# Patient Record
Sex: MALE | Race: WHITE | NOT HISPANIC OR LATINO
[De-identification: names, ages, dates, MRNs, and addresses within clinical notes are randomized per-mention and may not be internally consistent; named-entity substitution may affect disease eponyms.]

---

## 2023-03-29 PROBLEM — Z00.00 ENCOUNTER FOR PREVENTIVE HEALTH EXAMINATION: Status: ACTIVE | Noted: 2023-03-29

## 2023-04-21 ENCOUNTER — NON-APPOINTMENT (OUTPATIENT)
Age: 83
End: 2023-04-21

## 2023-04-21 ENCOUNTER — APPOINTMENT (OUTPATIENT)
Dept: PULMONOLOGY | Facility: CLINIC | Age: 83
End: 2023-04-21
Payer: MEDICARE

## 2023-04-21 VITALS
HEART RATE: 97 BPM | TEMPERATURE: 97.6 F | HEIGHT: 64 IN | BODY MASS INDEX: 25.61 KG/M2 | DIASTOLIC BLOOD PRESSURE: 70 MMHG | OXYGEN SATURATION: 97 % | SYSTOLIC BLOOD PRESSURE: 112 MMHG | WEIGHT: 150 LBS

## 2023-04-21 DIAGNOSIS — Z86.39 PERSONAL HISTORY OF OTHER ENDOCRINE, NUTRITIONAL AND METABOLIC DISEASE: ICD-10-CM

## 2023-04-21 DIAGNOSIS — Z86.79 PERSONAL HISTORY OF OTHER DISEASES OF THE CIRCULATORY SYSTEM: ICD-10-CM

## 2023-04-21 DIAGNOSIS — U07.1 COVID-19: ICD-10-CM

## 2023-04-21 DIAGNOSIS — E78.00 PURE HYPERCHOLESTEROLEMIA, UNSPECIFIED: ICD-10-CM

## 2023-04-21 DIAGNOSIS — Z85.72 PERSONAL HISTORY OF NON-HODGKIN LYMPHOMAS: ICD-10-CM

## 2023-04-21 PROCEDURE — 99204 OFFICE O/P NEW MOD 45 MIN: CPT | Mod: 25

## 2023-04-21 PROCEDURE — 94010 BREATHING CAPACITY TEST: CPT

## 2023-04-21 NOTE — REVIEW OF SYSTEMS
[Postnasal Drip] : postnasal drip [Nasal Congestion] : no nasal congestion [Sinus Problems] : no sinus problems [Cough] : no cough [Sputum] : no sputum [Dyspnea] : no dyspnea [Wheezing] : no wheezing [SOB on Exertion] : no sob on exertion [Chest Discomfort] : no chest discomfort [Edema] : no edema [Orthopnea] : no orthopnea [Palpitations] : no palpitations [Phlebitis] : no phlebitis [Seasonal Allergies] : no seasonal allergies [GERD] : no gerd [Diarrhea] : no diarrhea [Constipation] : no constipation [Dysphagia] : no dysphagia [Nocturia] : no nocturia [Frequency] : no dysuria [Anemia] : no anemia [Headache] : no headache [Dizziness] : no dizziness [Memory Loss] : no memory loss [TextBox_91] : shoulder  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

## 2023-04-21 NOTE — ASSESSMENT
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

## 2023-04-21 NOTE — PHYSICAL EXAM
[No Acute Distress] : no acute distress [Normal Oropharynx] : normal oropharynx [No Neck Mass] : no neck mass [Normal Appearance] : normal appearance [Normal Rate/Rhythm] : normal rate/rhythm [Normal S1, S2] : normal s1, s2 [No Murmurs] : no murmurs [No Resp Distress] : no resp distress [No Abnormalities] : no abnormalities [Benign] : benign [Normal Gait] : normal gait [No Clubbing] : no clubbing [No Cyanosis] : no cyanosis [No Edema] : no edema [FROM] : FROM [Normal Color/ Pigmentation] : normal color/ pigmentation [No Focal Deficits] : no focal deficits [Oriented x3] : oriented x3 [Normal Affect] : normal affect [TextBox_68] : diffuse bilat crackles : cellophane C/w PULM  Fibrosis

## 2023-04-21 NOTE — HISTORY OF PRESENT ILLNESS
[Former] : former [Never] : never [TextBox_4] : 4/21/23 The patient is a 83 yo malse with a hx of finding of pulmonary fibrosis on a CTA of the lung being done to R/O a PE. He has no resp sx: cough, sputum, dyspnea, wheeze or hx of pna. 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

## 2023-04-21 NOTE — REASON FOR VISIT
[Initial] : an initial visit [Pulmonary Fibrosis] : pulmonary fibrosis [Cough] : cough [TextBox_44] : Pt states he was sent by his PCP for possible pulmonary Fibrosis. CT Chest 4/6/2023 , CT Chest 12/2/2021 [facility], PET/CT 9/26/2019 [facility]. Pt states he has a dry cough.  No other pulmonary issues.

## 2023-05-30 ENCOUNTER — APPOINTMENT (OUTPATIENT)
Dept: PULMONOLOGY | Facility: CLINIC | Age: 83
End: 2023-05-30
Payer: MEDICARE

## 2023-05-30 VITALS
DIASTOLIC BLOOD PRESSURE: 88 MMHG | TEMPERATURE: 97.3 F | OXYGEN SATURATION: 98 % | BODY MASS INDEX: 25.61 KG/M2 | HEART RATE: 75 BPM | WEIGHT: 150 LBS | HEIGHT: 64 IN | SYSTOLIC BLOOD PRESSURE: 142 MMHG

## 2023-05-30 PROCEDURE — 94010 BREATHING CAPACITY TEST: CPT

## 2023-05-30 PROCEDURE — 94727 GAS DIL/WSHOT DETER LNG VOL: CPT

## 2023-05-30 PROCEDURE — 94729 DIFFUSING CAPACITY: CPT

## 2023-05-30 PROCEDURE — 99214 OFFICE O/P EST MOD 30 MIN: CPT | Mod: 25

## 2023-05-30 NOTE — REASON FOR VISIT
[Follow-Up] : a follow-up visit [Cough] : cough [Pulmonary Fibrosis] : pulmonary fibrosis [TextBox_44] : 1 month. PFT performed today, 5/30/2023. Pt states he has a cough in the morning when he gets out of bed. Pt did not do labs ordered as he did not know they were to be done right away.

## 2023-05-30 NOTE — REVIEW OF SYSTEMS
[Nasal Congestion] : no nasal congestion [Postnasal Drip] : postnasal drip [Sinus Problems] : no sinus problems [Cough] : no cough [Sputum] : no sputum [Dyspnea] : no dyspnea [Wheezing] : no wheezing [SOB on Exertion] : no sob on exertion [Chest Discomfort] : no chest discomfort [Edema] : no edema [Orthopnea] : no orthopnea [Palpitations] : no palpitations [Phlebitis] : no phlebitis [Seasonal Allergies] : no seasonal allergies [GERD] : no gerd [Diarrhea] : no diarrhea [Constipation] : no constipation [Dysphagia] : no dysphagia [Nocturia] : no nocturia [Frequency] : no dysuria [Anemia] : no anemia [Headache] : no headache [Dizziness] : no dizziness [Memory Loss] : no memory loss [TextBox_91] : shoulder  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

## 2023-05-30 NOTE — PHYSICAL EXAM
[No Acute Distress] : no acute distress [Normal Oropharynx] : normal oropharynx [Normal Appearance] : normal appearance [No Neck Mass] : no neck mass [Normal Rate/Rhythm] : normal rate/rhythm [Normal S1, S2] : normal s1, s2 [No Murmurs] : no murmurs [No Resp Distress] : no resp distress [No Abnormalities] : no abnormalities [Benign] : benign [Normal Gait] : normal gait [No Clubbing] : no clubbing [No Cyanosis] : no cyanosis [No Edema] : no edema [FROM] : FROM [Normal Color/ Pigmentation] : normal color/ pigmentation [No Focal Deficits] : no focal deficits [Oriented x3] : oriented x3 [Normal Affect] : normal affect [TextBox_68] : diffuse bilat crackles : cellophane C/w PULM  Fibrosis

## 2023-05-30 NOTE — HISTORY OF PRESENT ILLNESS
[Former] : former [Never] : never [TextBox_4] : 4/21/23 The patient is a 81 yo malse with a hx of finding of pulmonary fibrosis on a CTA of the lung being done to R/O a PE. He has no resp sx: cough, sputum, dyspnea, wheeze or hx of pna. 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 He did have a PFt  and prior to that a Ct of th chest.  He had a PFT in the office today  [TextBox_11] : 1 [TextBox_13] : 21 [YearQuit] : 1977

## 2023-05-30 NOTE — ASSESSMENT
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     At this point his spirometry is normal , the rest of a PFt will  measure the DLCo    for now he has sufficient fibrosis and progression to warrant  RX  time spent 45 minn  time spent 45 min counseling, education, documentation, imaging reviewed, medication reviewed, inhaler demonstrated, old records reviewed, HX and PE   \par \par 5/30/23 The patient is reporting his activity is stable  He had a PFt which quantifies his pulmnary function He had a normal spirometry with a all valves > 100%   His Volumes  revealed a normal TLC and FRC  His DLCO corrected is 75% given this surprisingly better than expected PFT  I have decided that at age 83 he can wait to see if he needs to start therapy as he has sufficient reserves to fit his activity    serial PFts will be followed the next one in 3mos time spent 30 min  counseling, education, documentation, imaging reviewed, medication reviewed, inhaler demonstrated, old records reviewed, HX and PE

## 2023-09-05 ENCOUNTER — APPOINTMENT (OUTPATIENT)
Dept: PULMONOLOGY | Facility: CLINIC | Age: 83
End: 2023-09-05

## 2023-09-05 ENCOUNTER — APPOINTMENT (OUTPATIENT)
Dept: PULMONOLOGY | Facility: CLINIC | Age: 83
End: 2023-09-05
Payer: MEDICARE

## 2023-09-05 VITALS
SYSTOLIC BLOOD PRESSURE: 122 MMHG | HEART RATE: 80 BPM | OXYGEN SATURATION: 97 % | DIASTOLIC BLOOD PRESSURE: 78 MMHG | WEIGHT: 150 LBS | BODY MASS INDEX: 25.61 KG/M2 | TEMPERATURE: 97.4 F | HEIGHT: 64 IN

## 2023-09-05 PROCEDURE — 94010 BREATHING CAPACITY TEST: CPT

## 2023-09-05 PROCEDURE — 94727 GAS DIL/WSHOT DETER LNG VOL: CPT

## 2023-09-05 PROCEDURE — 94729 DIFFUSING CAPACITY: CPT

## 2023-09-05 RX ORDER — NINTEDANIB 150 MG/1
150 CAPSULE ORAL
Qty: 90 | Refills: 3 | Status: DISCONTINUED | COMMUNITY
Start: 2023-04-21 | End: 2023-09-05

## 2023-09-06 ENCOUNTER — NON-APPOINTMENT (OUTPATIENT)
Age: 83
End: 2023-09-06

## 2023-10-31 ENCOUNTER — APPOINTMENT (OUTPATIENT)
Dept: PULMONOLOGY | Facility: CLINIC | Age: 83
End: 2023-10-31
Payer: MEDICARE

## 2023-10-31 VITALS
WEIGHT: 147 LBS | BODY MASS INDEX: 25.1 KG/M2 | HEART RATE: 68 BPM | OXYGEN SATURATION: 98 % | TEMPERATURE: 97.4 F | DIASTOLIC BLOOD PRESSURE: 80 MMHG | HEIGHT: 64 IN | SYSTOLIC BLOOD PRESSURE: 130 MMHG

## 2023-10-31 PROCEDURE — 94727 GAS DIL/WSHOT DETER LNG VOL: CPT

## 2023-10-31 PROCEDURE — 99214 OFFICE O/P EST MOD 30 MIN: CPT | Mod: 25

## 2023-10-31 PROCEDURE — 94010 BREATHING CAPACITY TEST: CPT

## 2023-10-31 PROCEDURE — 94729 DIFFUSING CAPACITY: CPT

## 2023-10-31 RX ORDER — FAMOTIDINE 10 MG/1
TABLET, FILM COATED ORAL
Refills: 0 | Status: ACTIVE | COMMUNITY

## 2023-10-31 RX ORDER — LEVOTHYROXINE SODIUM 0.17 MG/1
TABLET ORAL
Refills: 0 | Status: ACTIVE | COMMUNITY

## 2023-10-31 RX ORDER — DOCUSATE SODIUM 100 MG/1
CAPSULE ORAL
Refills: 0 | Status: ACTIVE | COMMUNITY

## 2023-10-31 RX ORDER — ATORVASTATIN CALCIUM 10 MG/1
10 TABLET, FILM COATED ORAL
Refills: 0 | Status: ACTIVE | COMMUNITY

## 2023-10-31 RX ORDER — VIT A/VIT C/VIT E/ZINC/COPPER 2148-113
TABLET ORAL
Refills: 0 | Status: ACTIVE | COMMUNITY

## 2023-10-31 RX ORDER — ASPIRIN 81 MG
81 TABLET, DELAYED RELEASE (ENTERIC COATED) ORAL
Refills: 0 | Status: ACTIVE | COMMUNITY

## 2023-10-31 RX ORDER — AMLODIPINE BESYLATE 2.5 MG/1
2.5 TABLET ORAL
Refills: 0 | Status: ACTIVE | COMMUNITY

## 2023-10-31 RX ORDER — LOSARTAN POTASSIUM 100 MG/1
TABLET, FILM COATED ORAL
Refills: 0 | Status: ACTIVE | COMMUNITY

## 2023-12-14 ENCOUNTER — OFFICE (OUTPATIENT)
Facility: LOCATION | Age: 83
Setting detail: OPHTHALMOLOGY
End: 2023-12-14
Payer: MEDICARE

## 2023-12-14 ENCOUNTER — RX ONLY (RX ONLY)
Age: 83
End: 2023-12-14

## 2023-12-14 DIAGNOSIS — H35.373: ICD-10-CM

## 2023-12-14 DIAGNOSIS — H01.002: ICD-10-CM

## 2023-12-14 DIAGNOSIS — H43.813: ICD-10-CM

## 2023-12-14 DIAGNOSIS — H01.005: ICD-10-CM

## 2023-12-14 DIAGNOSIS — H01.004: ICD-10-CM

## 2023-12-14 DIAGNOSIS — H01.001: ICD-10-CM

## 2023-12-14 DIAGNOSIS — H16.223: ICD-10-CM

## 2023-12-14 DIAGNOSIS — H31.29: ICD-10-CM

## 2023-12-14 DIAGNOSIS — H35.3131: ICD-10-CM

## 2023-12-14 DIAGNOSIS — H35.033: ICD-10-CM

## 2023-12-14 PROBLEM — H02.83 DERMATOCHALASIS ; BOTH EYES: Status: ACTIVE | Noted: 2023-12-14

## 2023-12-14 PROBLEM — H43.393 VITREOUS FLOATERS; BOTH EYES: Status: ACTIVE | Noted: 2023-12-14

## 2023-12-14 PROBLEM — H18.513 ENDOTHELIAL CORNEAL DYSTROPHY; BOTH EYES: Status: ACTIVE | Noted: 2023-12-14

## 2023-12-14 PROBLEM — Z96.1 PSEUDOPHAKIA ; BOTH EYES: Status: ACTIVE | Noted: 2023-12-14

## 2023-12-14 PROCEDURE — 92014 COMPRE OPH EXAM EST PT 1/>: CPT | Performed by: OPTOMETRIST

## 2023-12-14 ASSESSMENT — LID EXAM ASSESSMENTS
OS_DERMATOCHALASIS: 1+
OD_DERMATOCHALASIS: 1+
OD_BLEPHARITIS: RLL RUL T
OS_BLEPHARITIS: LLL LUL T

## 2023-12-14 ASSESSMENT — CORNEAL DYSTROPHY - POSTERIOR
OD_POSTERIORDYSTROPHY: GUTTATA
OS_POSTERIORDYSTROPHY: GUTTATA

## 2023-12-14 ASSESSMENT — SUPERFICIAL PUNCTATE KERATITIS (SPK)
OS_SPK: T
OD_SPK: T

## 2023-12-14 ASSESSMENT — CONFRONTATIONAL VISUAL FIELD TEST (CVF)
OD_FINDINGS: FULL
OS_FINDINGS: FULL

## 2023-12-15 ASSESSMENT — REFRACTION_CURRENTRX
OD_SPHERE: +2.00
OS_SPHERE: +2.00
OD_OVR_VA: 20/
OS_OVR_VA: 20/

## 2024-01-31 ENCOUNTER — APPOINTMENT (OUTPATIENT)
Dept: PULMONOLOGY | Facility: CLINIC | Age: 84
End: 2024-01-31
Payer: MEDICARE

## 2024-01-31 VITALS
HEIGHT: 64 IN | HEART RATE: 78 BPM | TEMPERATURE: 98.4 F | SYSTOLIC BLOOD PRESSURE: 130 MMHG | OXYGEN SATURATION: 98 % | BODY MASS INDEX: 26.02 KG/M2 | DIASTOLIC BLOOD PRESSURE: 80 MMHG | WEIGHT: 152.38 LBS

## 2024-01-31 PROCEDURE — 94727 GAS DIL/WSHOT DETER LNG VOL: CPT

## 2024-01-31 PROCEDURE — 94729 DIFFUSING CAPACITY: CPT

## 2024-01-31 PROCEDURE — 99214 OFFICE O/P EST MOD 30 MIN: CPT | Mod: 25

## 2024-01-31 PROCEDURE — 94010 BREATHING CAPACITY TEST: CPT

## 2024-01-31 NOTE — HISTORY OF PRESENT ILLNESS
[Never] : never [TextBox_4] : 1/31/24  The patient has ILd/IPF  he had a repeat PFT  He does not have any increased resp sx   He has not decrease any activities b/o poor breathing  [TextBox_11] : 1 [TextBox_13] : 21 [YearQuit] : 1977

## 2024-01-31 NOTE — ASSESSMENT
[FreeTextEntry1] : 1/31/24 The patient is stable He has IPF  He had a PFT and his FEV1 is stable and his  DLCO is 58%  pior tests  ( 66% then 53% and then 51%) There is a slight trend downward but today's test is stable to improved   plan is to f/u his Ct scan and PFt in May  time spent 30 min  counseling, education, documentation, imaging reviewed, medication reviewed,, old records reviewed, HX and PE

## 2024-01-31 NOTE — REASON FOR VISIT
[Follow-Up] : a follow-up visit [Cough] : cough [Pulmonary Fibrosis] : pulmonary fibrosis [Shortness of Breath] : shortness of breath [TextBox_44] : 3 month. PFT performed today. Patient report still has occasional coughing. Patient state when increase active SOB. Patient has no pulmonary complains at this time.

## 2024-02-20 NOTE — REASON FOR VISIT
[Pulmonary Fibrosis] : pulmonary fibrosis [TextBox_44] : 3 month.  Patient states he had a PFT today.  No Pulmonary complaints today.

## 2024-05-15 ENCOUNTER — APPOINTMENT (OUTPATIENT)
Dept: PULMONOLOGY | Facility: CLINIC | Age: 84
End: 2024-05-15
Payer: MEDICARE

## 2024-05-15 VITALS
SYSTOLIC BLOOD PRESSURE: 130 MMHG | HEIGHT: 64 IN | BODY MASS INDEX: 26.8 KG/M2 | WEIGHT: 157 LBS | HEART RATE: 73 BPM | DIASTOLIC BLOOD PRESSURE: 82 MMHG | TEMPERATURE: 98.1 F | OXYGEN SATURATION: 98 %

## 2024-05-15 DIAGNOSIS — J84.112 IDIOPATHIC PULMONARY FIBROSIS: ICD-10-CM

## 2024-05-15 PROCEDURE — 94727 GAS DIL/WSHOT DETER LNG VOL: CPT

## 2024-05-15 PROCEDURE — 94010 BREATHING CAPACITY TEST: CPT

## 2024-05-15 PROCEDURE — 94729 DIFFUSING CAPACITY: CPT

## 2024-05-15 PROCEDURE — 99214 OFFICE O/P EST MOD 30 MIN: CPT | Mod: 25

## 2024-05-15 RX ORDER — ASCORBIC ACID 500 MG
TABLET ORAL
Refills: 0 | Status: ACTIVE | COMMUNITY

## 2024-05-15 RX ORDER — SPIRONOLACTONE 25 MG/1
25 TABLET ORAL
Refills: 0 | Status: ACTIVE | COMMUNITY

## 2024-05-15 RX ORDER — MELATONIN 3 MG
TABLET ORAL
Refills: 0 | Status: ACTIVE | COMMUNITY

## 2024-05-15 NOTE — HISTORY OF PRESENT ILLNESS
[Former] : former [Never] : never [TextBox_4] : 1/31/24 The patient has ILd/IPF he had a repeat PFT He does not have any increased resp sx He has not decrease any activities b/o poor breathing.   # Packs per day: 1   # Years: 21   Year quit: 1977     5/15/2024 Logan Rubio is a 83-year-old male who is very active.  The patient has IPF radiologically.  The patient's been monitored radiologically and by PFTs.  He had his CAT scan of the chest on April 8, 2024 in the findings were unchanged compared to his CAT scan of April 6, 2023 patient will have PFTs today. [TextBox_11] : 1 [TextBox_13] : 21 [YearQuit] : 1977

## 2024-05-15 NOTE — REASON FOR VISIT
[Follow-Up] : a follow-up visit [Cough] : cough [Pulmonary Fibrosis] : pulmonary fibrosis [Shortness of Breath] : shortness of breath [TextEntry] : 4 month. Patient report still has occasional coughing. Patient state when increase active SOB. Patient has no pulmonary complains at this time.

## 2024-05-15 NOTE — ASSESSMENT
[FreeTextEntry1] : Idiopathic pulmonary fibrosis (516.31) (J84.112) 1/31/24 The patient is stable He has IPF He had a PFT and his FEV1 is stable and his DLCO is 58% pior tests ( 66% then 53% and then 51%) There is a slight trend downward but today's test is stable to improved plan is to f/u his Ct scan and PFt in May time spent 30 min counseling, education, documentation, imaging reviewed, medication reviewed, old records reviewed, HX and PE.  5/15/2024 1) the patient's CAT scan was reviewed and changes of the same as April 6, 2023 2) the patient's pulmonary function test revealed his FEV1 was 2.1 897% predicted improved from prior studies his total lung capacity was 4.5 878% stable from previous studies and the DLCO was 69% which were higher than the prior studies of 51% 53% 58% and 66% patient continues to remain stable and will be can continue to be monitored and we will hold off any therapy at this time.  Time spent 30 minutes counseling, education, documentation, imaging reviewed, medication reviewed, old records reviewed, HX and PE

## 2024-06-13 ENCOUNTER — OFFICE (OUTPATIENT)
Facility: LOCATION | Age: 84
Setting detail: OPHTHALMOLOGY
End: 2024-06-13
Payer: MEDICARE

## 2024-06-13 DIAGNOSIS — H31.29: ICD-10-CM

## 2024-06-13 DIAGNOSIS — H01.004: ICD-10-CM

## 2024-06-13 DIAGNOSIS — H01.005: ICD-10-CM

## 2024-06-13 DIAGNOSIS — H01.001: ICD-10-CM

## 2024-06-13 DIAGNOSIS — H43.813: ICD-10-CM

## 2024-06-13 DIAGNOSIS — H01.002: ICD-10-CM

## 2024-06-13 DIAGNOSIS — H16.223: ICD-10-CM

## 2024-06-13 DIAGNOSIS — H35.373: ICD-10-CM

## 2024-06-13 DIAGNOSIS — H35.3131: ICD-10-CM

## 2024-06-13 DIAGNOSIS — H35.033: ICD-10-CM

## 2024-06-13 PROBLEM — H02.83 DERMATOCHALSIS ; BOTH EYES: Status: ACTIVE | Noted: 2024-06-13

## 2024-06-13 PROCEDURE — 92014 COMPRE OPH EXAM EST PT 1/>: CPT | Performed by: OPTOMETRIST

## 2024-06-13 PROCEDURE — 92134 CPTRZ OPH DX IMG PST SGM RTA: CPT | Performed by: OPTOMETRIST

## 2024-06-13 ASSESSMENT — LID EXAM ASSESSMENTS
OD_BLEPHARITIS: RLL RUL T
OS_DERMATOCHALASIS: 1+
OD_DERMATOCHALASIS: 1+
OS_BLEPHARITIS: LLL LUL T

## 2024-06-13 ASSESSMENT — CONFRONTATIONAL VISUAL FIELD TEST (CVF)
OD_FINDINGS: FULL
OS_FINDINGS: FULL

## 2024-11-15 ENCOUNTER — APPOINTMENT (OUTPATIENT)
Dept: PULMONOLOGY | Facility: CLINIC | Age: 84
End: 2024-11-15
Payer: MEDICARE

## 2024-11-15 ENCOUNTER — NON-APPOINTMENT (OUTPATIENT)
Age: 84
End: 2024-11-15

## 2024-11-15 VITALS
WEIGHT: 142 LBS | TEMPERATURE: 97.2 F | BODY MASS INDEX: 24.24 KG/M2 | OXYGEN SATURATION: 97 % | HEART RATE: 93 BPM | HEIGHT: 64 IN | SYSTOLIC BLOOD PRESSURE: 132 MMHG | DIASTOLIC BLOOD PRESSURE: 82 MMHG

## 2024-11-15 DIAGNOSIS — J84.112 IDIOPATHIC PULMONARY FIBROSIS: ICD-10-CM

## 2024-11-15 PROCEDURE — 99214 OFFICE O/P EST MOD 30 MIN: CPT | Mod: 25

## 2024-11-15 PROCEDURE — 94010 BREATHING CAPACITY TEST: CPT

## 2024-11-15 PROCEDURE — ZZZZZ: CPT

## 2024-11-15 PROCEDURE — 94727 GAS DIL/WSHOT DETER LNG VOL: CPT

## 2024-11-15 PROCEDURE — 94729 DIFFUSING CAPACITY: CPT

## 2024-12-12 ENCOUNTER — OFFICE (OUTPATIENT)
Facility: LOCATION | Age: 84
Setting detail: OPHTHALMOLOGY
End: 2024-12-12
Payer: MEDICARE

## 2024-12-12 DIAGNOSIS — H35.033: ICD-10-CM

## 2024-12-12 DIAGNOSIS — H16.223: ICD-10-CM

## 2024-12-12 DIAGNOSIS — H31.29: ICD-10-CM

## 2024-12-12 DIAGNOSIS — H35.3131: ICD-10-CM

## 2024-12-12 DIAGNOSIS — H35.373: ICD-10-CM

## 2024-12-12 PROCEDURE — 92250 FUNDUS PHOTOGRAPHY W/I&R: CPT | Performed by: OPTOMETRIST

## 2024-12-12 PROCEDURE — 92014 COMPRE OPH EXAM EST PT 1/>: CPT | Performed by: OPTOMETRIST

## 2024-12-12 ASSESSMENT — LID EXAM ASSESSMENTS
OS_DERMATOCHALASIS: 1+
OS_BLEPHARITIS: LLL LUL T
OD_BLEPHARITIS: RLL RUL T
OD_DERMATOCHALASIS: 1+

## 2024-12-12 ASSESSMENT — SUPERFICIAL PUNCTATE KERATITIS (SPK)
OD_SPK: T
OS_SPK: T

## 2024-12-12 ASSESSMENT — TONOMETRY
OD_IOP_MMHG: 14
OS_IOP_MMHG: 14

## 2024-12-12 ASSESSMENT — CORNEAL DYSTROPHY - POSTERIOR
OD_POSTERIORDYSTROPHY: GUTTATA
OS_POSTERIORDYSTROPHY: GUTTATA

## 2024-12-12 ASSESSMENT — CONFRONTATIONAL VISUAL FIELD TEST (CVF)
OS_FINDINGS: FULL
OD_FINDINGS: FULL

## 2024-12-16 PROBLEM — H02.831 DERMATOCHALSIS; RIGHT UPPER LID, LEFT UPPER LID: Status: ACTIVE | Noted: 2024-12-12

## 2024-12-16 PROBLEM — H02.834 DERMATOCHALSIS; RIGHT UPPER LID, LEFT UPPER LID: Status: ACTIVE | Noted: 2024-12-12

## 2024-12-16 ASSESSMENT — REFRACTION_AUTOREFRACTION
OD_CYLINDER: +1.00
OS_CYLINDER: +0.50
OS_SPHERE: +0.25
OS_AXIS: 180
OD_AXIS: 176
OD_SPHERE: PLANO

## 2024-12-16 ASSESSMENT — LID POSITION - DERMATOCHALASIS
OD_DERMATOCHALASIS: 1+
OS_DERMATOCHALASIS: 1+

## 2024-12-16 ASSESSMENT — REFRACTION_CURRENTRX
OD_OVR_VA: 20/
OD_SPHERE: +2.00
OS_SPHERE: +2.00
OS_OVR_VA: 20/

## 2024-12-16 ASSESSMENT — VISUAL ACUITY
OD_BCVA: 20/20-2
OS_BCVA: 20/40

## 2025-01-20 ENCOUNTER — APPOINTMENT (OUTPATIENT)
Dept: PULMONOLOGY | Facility: CLINIC | Age: 85
End: 2025-01-20
Payer: MEDICARE

## 2025-01-20 VITALS
TEMPERATURE: 96.6 F | OXYGEN SATURATION: 100 % | SYSTOLIC BLOOD PRESSURE: 130 MMHG | WEIGHT: 140 LBS | DIASTOLIC BLOOD PRESSURE: 76 MMHG | HEART RATE: 77 BPM | HEIGHT: 64 IN | BODY MASS INDEX: 23.9 KG/M2

## 2025-01-20 DIAGNOSIS — J84.112 IDIOPATHIC PULMONARY FIBROSIS: ICD-10-CM

## 2025-01-20 DIAGNOSIS — Z01.811 ENCOUNTER FOR PREPROCEDURAL RESPIRATORY EXAMINATION: ICD-10-CM

## 2025-01-20 PROCEDURE — 94727 GAS DIL/WSHOT DETER LNG VOL: CPT

## 2025-01-20 PROCEDURE — 94729 DIFFUSING CAPACITY: CPT

## 2025-01-20 PROCEDURE — ZZZZZ: CPT

## 2025-01-20 PROCEDURE — 94010 BREATHING CAPACITY TEST: CPT

## 2025-01-20 PROCEDURE — 99214 OFFICE O/P EST MOD 30 MIN: CPT | Mod: 25

## 2025-02-14 ENCOUNTER — APPOINTMENT (OUTPATIENT)
Dept: PULMONOLOGY | Facility: CLINIC | Age: 85
End: 2025-02-14

## 2025-04-22 ENCOUNTER — APPOINTMENT (OUTPATIENT)
Dept: PULMONOLOGY | Facility: CLINIC | Age: 85
End: 2025-04-22
Payer: MEDICARE

## 2025-04-22 VITALS
HEIGHT: 64 IN | BODY MASS INDEX: 23.05 KG/M2 | WEIGHT: 135 LBS | DIASTOLIC BLOOD PRESSURE: 76 MMHG | SYSTOLIC BLOOD PRESSURE: 124 MMHG | OXYGEN SATURATION: 99 % | TEMPERATURE: 97.2 F | HEART RATE: 77 BPM

## 2025-04-22 DIAGNOSIS — J84.112 IDIOPATHIC PULMONARY FIBROSIS: ICD-10-CM

## 2025-04-22 PROCEDURE — 99214 OFFICE O/P EST MOD 30 MIN: CPT

## 2025-06-09 ENCOUNTER — OFFICE (OUTPATIENT)
Facility: LOCATION | Age: 85
Setting detail: OPHTHALMOLOGY
End: 2025-06-09
Payer: MEDICARE

## 2025-06-09 DIAGNOSIS — H17.9: ICD-10-CM

## 2025-06-09 DIAGNOSIS — H43.393: ICD-10-CM

## 2025-06-09 DIAGNOSIS — H35.033: ICD-10-CM

## 2025-06-09 DIAGNOSIS — H35.3131: ICD-10-CM

## 2025-06-09 DIAGNOSIS — H16.223: ICD-10-CM

## 2025-06-09 PROBLEM — H35.443 AGE-RELATED RETICULAR DEGENERATION OF RETINA; BOTH EYES: Status: ACTIVE | Noted: 2025-06-09

## 2025-06-09 PROCEDURE — 92014 COMPRE OPH EXAM EST PT 1/>: CPT | Performed by: OPTOMETRIST

## 2025-06-09 PROCEDURE — 92250 FUNDUS PHOTOGRAPHY W/I&R: CPT | Performed by: OPTOMETRIST

## 2025-06-09 ASSESSMENT — CORNEAL DYSTROPHY - POSTERIOR
OS_POSTERIORDYSTROPHY: GUTTATA
OD_POSTERIORDYSTROPHY: GUTTATA

## 2025-06-09 ASSESSMENT — REFRACTION_AUTOREFRACTION
OD_CYLINDER: +1.50
OD_AXIS: 001
OD_SPHERE: -0.50
OS_SPHERE: -0.25
OS_CYLINDER: +0.75
OS_AXIS: 161

## 2025-06-09 ASSESSMENT — TONOMETRY
OS_IOP_MMHG: 15
OD_IOP_MMHG: 15

## 2025-06-09 ASSESSMENT — REFRACTION_CURRENTRX
OS_SPHERE: +2.00
OS_OVR_VA: 20/
OD_SPHERE: +2.00
OD_OVR_VA: 20/

## 2025-06-09 ASSESSMENT — CONFRONTATIONAL VISUAL FIELD TEST (CVF)
OD_FINDINGS: FULL
OS_FINDINGS: FULL

## 2025-06-09 ASSESSMENT — VISUAL ACUITY
OS_BCVA: 20/40-2
OD_BCVA: 20/20-2

## 2025-06-09 ASSESSMENT — SUPERFICIAL PUNCTATE KERATITIS (SPK)
OD_SPK: T
OS_SPK: T

## 2025-08-18 ENCOUNTER — NON-APPOINTMENT (OUTPATIENT)
Age: 85
End: 2025-08-18